# Patient Record
Sex: FEMALE | URBAN - METROPOLITAN AREA
[De-identification: names, ages, dates, MRNs, and addresses within clinical notes are randomized per-mention and may not be internally consistent; named-entity substitution may affect disease eponyms.]

---

## 2021-09-02 ENCOUNTER — OFFICE VISIT (OUTPATIENT)
Dept: URBAN - METROPOLITAN AREA CLINIC 3 | Facility: CLINIC | Age: 61
End: 2021-09-02
Payer: COMMERCIAL

## 2021-09-02 DIAGNOSIS — B02.8 ZOSTER WITH OTHER COMPLICATIONS: Primary | ICD-10-CM

## 2021-09-02 PROCEDURE — 99203 OFFICE O/P NEW LOW 30 MIN: CPT | Performed by: OPTOMETRIST

## 2021-09-02 ASSESSMENT — INTRAOCULAR PRESSURE
OS: 19
OD: 17

## 2021-09-02 NOTE — IMPRESSION/PLAN
Impression: Zoster with other complications: I13.2. Plan: Start Valtrex 1000 mg TID x 10 days. No corneal involvement at this time, however will hold on steroids. Advised of risk of long term pain management.

## 2021-09-10 ENCOUNTER — OFFICE VISIT (OUTPATIENT)
Dept: URBAN - METROPOLITAN AREA CLINIC 3 | Facility: CLINIC | Age: 61
End: 2021-09-10
Payer: COMMERCIAL

## 2021-09-10 PROCEDURE — 92012 INTRM OPH EXAM EST PATIENT: CPT | Performed by: OPTOMETRIST

## 2021-09-10 ASSESSMENT — INTRAOCULAR PRESSURE
OS: 10
OD: 11

## 2021-09-10 NOTE — IMPRESSION/PLAN
Impression: Zoster with other complications: D93.9. Plan: Continue valacyclovir until complete. Discussed possibility of long term pain development and will need to see PCP or neurology.

## 2021-09-24 ENCOUNTER — OFFICE VISIT (OUTPATIENT)
Dept: URBAN - METROPOLITAN AREA CLINIC 3 | Facility: CLINIC | Age: 61
End: 2021-09-24
Payer: COMMERCIAL

## 2021-09-24 DIAGNOSIS — H11.32 SUBCONJUNCTIVAL HEMORRHAGE OF LEFT EYE: ICD-10-CM

## 2021-09-24 DIAGNOSIS — H25.13 AGE-RELATED NUCLEAR CATARACT, BILATERAL: ICD-10-CM

## 2021-09-24 DIAGNOSIS — H52.4 PRESBYOPIA: ICD-10-CM

## 2021-09-24 PROCEDURE — 92014 COMPRE OPH EXAM EST PT 1/>: CPT | Performed by: OPTOMETRIST

## 2021-09-24 ASSESSMENT — INTRAOCULAR PRESSURE
OS: 11
OD: 12

## 2021-09-24 NOTE — IMPRESSION/PLAN
Impression: Subconjunctival hemorrhage of left eye: H11.32. Plan: Fully resolved heme. No follow up needed.

## 2025-03-05 NOTE — IMPRESSION/PLAN
Impression: Presbyopia: H52.4. Plan: New spectacle Rx issued and released to patient. Abdominal Pain, N/V/D